# Patient Record
Sex: FEMALE | Race: WHITE | NOT HISPANIC OR LATINO | Employment: FULL TIME | URBAN - METROPOLITAN AREA
[De-identification: names, ages, dates, MRNs, and addresses within clinical notes are randomized per-mention and may not be internally consistent; named-entity substitution may affect disease eponyms.]

---

## 2018-09-14 RX ORDER — WARFARIN SODIUM 3 MG/1
3 TABLET ORAL DAILY
COMMUNITY

## 2018-09-14 RX ORDER — WARFARIN SODIUM 2 MG/1
2 TABLET ORAL DAILY
COMMUNITY

## 2018-09-14 NOTE — OR NURSING
Hx and meds reviewed, pre op instructions given. Pt aware may take the listed meds morning of surgery; coreg. Anesthesia and Dr Soares's  fasting guidelines reviewed with pt. Pt to check with MD prescribing coumadin on Monday 8/17 to see when to stop.

## 2018-10-02 ENCOUNTER — APPOINTMENT (OUTPATIENT)
Dept: ADMISSIONS | Facility: MEDICAL CENTER | Age: 53
End: 2018-10-02
Attending: ORTHOPAEDIC SURGERY
Payer: COMMERCIAL

## 2018-10-02 RX ORDER — CARVEDILOL 3.12 MG/1
3.12 TABLET ORAL EVERY MORNING
COMMUNITY

## 2018-10-02 RX ORDER — LISINOPRIL 2.5 MG/1
2.5 TABLET ORAL DAILY
COMMUNITY

## 2018-10-02 NOTE — OR NURSING
Pre-admit appointment completed.  Pt instructed to continue regularly prescribed medications through the day before surgery. Pt instructed to take the following medications the day of surgery with a sip of water, per anesthesia protocol; carvidilol.     Pt states her cardiologist Dr Del Rio with [facility] and that he sent clearance for surgery. Pt will hold coumadin and bridge with lovenox per his instructions.     LM for Dr Soares's MA (and note faxed) to please fax lab/EKG orders to [facility] [place] and to please fax us cardiology clearance. Pt will get testing done on Thursday.

## 2018-10-10 ENCOUNTER — OFFICE VISIT (OUTPATIENT)
Dept: CARDIOLOGY | Facility: MEDICAL CENTER | Age: 53
End: 2018-10-10
Payer: COMMERCIAL

## 2018-10-10 VITALS
HEART RATE: 82 BPM | BODY MASS INDEX: 26.5 KG/M2 | HEIGHT: 64 IN | DIASTOLIC BLOOD PRESSURE: 70 MMHG | OXYGEN SATURATION: 97 % | SYSTOLIC BLOOD PRESSURE: 110 MMHG | WEIGHT: 155.2 LBS

## 2018-10-10 DIAGNOSIS — I25.5 ISCHEMIC CARDIOMYOPATHY: ICD-10-CM

## 2018-10-10 DIAGNOSIS — I21.9 MYOCARDIAL INFARCTION, UNSPECIFIED MI TYPE, UNSPECIFIED ARTERY (HCC): ICD-10-CM

## 2018-10-10 DIAGNOSIS — Z79.01 CHRONIC ANTICOAGULATION: ICD-10-CM

## 2018-10-10 DIAGNOSIS — D68.2 FACTOR V DEFICIENCY (HCC): ICD-10-CM

## 2018-10-10 PROCEDURE — 99204 OFFICE O/P NEW MOD 45 MIN: CPT | Performed by: INTERNAL MEDICINE

## 2018-10-10 ASSESSMENT — ENCOUNTER SYMPTOMS
CHILLS: 0
COUGH: 0
SHORTNESS OF BREATH: 0
NERVOUS/ANXIOUS: 0
MYALGIAS: 0
DIZZINESS: 0
FEVER: 0
BLURRED VISION: 0
BRUISES/BLEEDS EASILY: 0
HEADACHES: 0
DEPRESSION: 0
PALPITATIONS: 0
HEARTBURN: 0
NAUSEA: 0
PND: 0
EYE DISCHARGE: 0

## 2018-10-10 NOTE — PROGRESS NOTES
Chief Complaint   Patient presents with   • MI (Anterior)     NP       Subjective:   Cara Sabillon is a 53 y.o. female who presents today self-referred for second opinion regarding her ischemic cardiomyopathy.  This lady has factor V Leiden deficiency.  In 2013 she had retrosternal discomfort that coming and going.  It then became quite severe with diaphoresis.  She received a 3.5 x 18 stent in the left anterior descending coronary artery.  Ejection fraction is 35% based on an echocardiogram in May 2018.  It is mentioned that changes have occurred since the previous echo of June 19, 2015.  A defibrillator has been recommended for primary prevention of sudden cardiac death.    She currently is on low-dose carvedilol and low-dose lisinopril.  She has been taking these drugs for about 3-4 months.  She states that, in the past, her low normal blood pressure apparently affected the usage of these medications.  She is tolerating these current dosages well.    She has been cleared for elective left knee replacement next week.    In spite of ejection fraction of 35% she is able to do [place] the [place] with no symptoms to suggest shortness of breath or myocardial ischemia.  No orthopnea or PND or edema.    EKG dated October 9, 2018 reviewed and consistent with anterior infarct.  Echocardiogram from May 2018 reviewed.  Holter monitor from April 26, 2018 reviewed demonstrating some PVCs and PACs.  Past Medical History:   Diagnosis Date   • Blood clotting disorder (HCC)     on coumadin-Factor V Leiden   • Bowel habit changes     constipation   • Left knee pain 10/02/2018   • Myocardial infarct (HCC) 05/2013    stent x 1   • Snoring     no sleep study   • Urinary incontinence     stress inc     Past Surgical History:   Procedure Laterality Date   • OTHER CARDIAC SURGERY  05/2013    stent x1   • TUBAL LIGATION Bilateral 1990's     No family history on file.  Social History     Social History   • Marital status: Single     " Spouse name: N/A   • Number of children: N/A   • Years of education: N/A     Occupational History   • Not on file.     Social History Main Topics   • Smoking status: Never Smoker   • Smokeless tobacco: Never Used   • Alcohol use 2.4 oz/week     4 Standard drinks or equivalent per week      Comment: 4 beers per week   • Drug use: No   • Sexual activity: Not on file     Other Topics Concern   • Not on file     Social History Narrative   • No narrative on file     Allergies   Allergen Reactions   • Erythromycin Vomiting     Outpatient Encounter Prescriptions as of 10/10/2018   Medication Sig Dispense Refill   • carvedilol (COREG) 3.125 MG Tab Take 3.125 mg by mouth every morning.     • lisinopril (PRINIVIL) 2.5 MG Tab Take 2.5 mg by mouth every day.     • aspirin EC (ECOTRIN) 81 MG Tablet Delayed Response Take 81 mg by mouth every day.     • warfarin (COUMADIN) 2 MG Tab Take 2 mg by mouth every day. 4 days per week     • warfarin (COUMADIN) 3 MG Tab Take 3 mg by mouth every day. 3 days a week       No facility-administered encounter medications on file as of 10/10/2018.      Review of Systems   Constitutional: Negative for chills, fever and malaise/fatigue.   Eyes: Negative for blurred vision and discharge.   Respiratory: Negative for cough and shortness of breath.    Cardiovascular: Negative for chest pain, palpitations, leg swelling and PND.   Gastrointestinal: Negative for heartburn and nausea.   Genitourinary: Negative for dysuria and urgency.   Musculoskeletal: Positive for joint pain. Negative for myalgias.   Skin: Negative for itching and rash.   Neurological: Negative for dizziness and headaches.   Endo/Heme/Allergies: Negative for environmental allergies. Does not bruise/bleed easily.   Psychiatric/Behavioral: Negative for depression. The patient is not nervous/anxious.         Objective:   /70 (BP Location: Left arm, Patient Position: Sitting, BP Cuff Size: Adult)   Pulse 82   Ht 1.626 m (5' 4\")   " Wt 70.4 kg (155 lb 3.2 oz)   SpO2 97%   BMI 26.64 kg/m²     Physical Exam   Constitutional: She is oriented to person, place, and time. She appears well-developed and well-nourished.   Cardiovascular: Normal rate and regular rhythm.  Exam reveals no gallop and no friction rub.    No murmur heard.  Pulmonary/Chest: Effort normal and breath sounds normal.   Musculoskeletal: She exhibits no edema.   Left knee brace   Neurological: She is alert and oriented to person, place, and time.   Skin: Skin is warm and dry.       Assessment:     1. Myocardial infarction, unspecified MI type, unspecified artery (HCC)  CANCELED: EKG   2. Ischemic cardiomyopathy     3. Factor V deficiency (HCC)     4. Chronic anticoagulation         Medical Decision Making:  Today's Assessment / Status / Plan:   We discussed the fact that she does meet criteria for primary prevention of sudden cardiac death with a defibrillator.  I suggested she discuss with the group's electrophysiologist the benefits alternatives and risks of a defibrillator.  We discussed continued cardiology follow-up with her primary group with a goal of also increasing dosages of ACE inhibitor.

## 2018-10-10 NOTE — LETTER
[facility]   [address], [address]  [place] [address]  Phone: [phone]  Fax: [phone]              Cara Sabillon  1965    Encounter Date: 10/10/2018    Leandro Subramanian M.D.          PROGRESS NOTE:  Chief Complaint   Patient presents with   • MI (Anterior)     NP       Subjective:   Cara Sabillon is a 53 y.o. female who presents today self-referred for second opinion regarding her ischemic cardiomyopathy.  This lady has factor V Leiden deficiency.  In 2013 she had retrosternal discomfort that coming and going.  It then became quite severe with diaphoresis.  She received a 3.5 x 18 stent in the left anterior descending coronary artery.  Ejection fraction is 35% based on an echocardiogram in May 2018.  It is mentioned that changes have occurred since the previous echo of June 19, 2015.  A defibrillator has been recommended for primary prevention of sudden cardiac death.    She currently is on low-dose carvedilol and low-dose lisinopril.  She has been taking these drugs for about 3-4 months.  She states that, in the past, her low normal blood pressure apparently affected the usage of these medications.  She is tolerating these current dosages well.    She has been cleared for elective left knee replacement next week.    In spite of ejection fraction of 35% she is able to do hiking in the mountains with no symptoms to suggest shortness of breath or myocardial ischemia.  No orthopnea or PND or edema.    Past Medical History:   Diagnosis Date   • Blood clotting disorder (HCC)     on coumadin-Factor V Leiden   • Bowel habit changes     constipation   • Left knee pain 10/02/2018   • Myocardial infarct (HCC) 05/2013    stent x 1   • Snoring     no sleep study   • Urinary incontinence     stress inc     Past Surgical History:   Procedure Laterality Date   • OTHER CARDIAC SURGERY  05/2013    stent x1   • TUBAL LIGATION Bilateral 1990's     No family history  on file.  Social History     Social History   • Marital status: Single     Spouse name: N/A   • Number of children: N/A   • Years of education: N/A     Occupational History   • Not on file.     Social History Main Topics   • Smoking status: Never Smoker   • Smokeless tobacco: Never Used   • Alcohol use 2.4 oz/week     4 Standard drinks or equivalent per week      Comment: 4 beers per week   • Drug use: No   • Sexual activity: Not on file     Other Topics Concern   • Not on file     Social History Narrative   • No narrative on file     Allergies   Allergen Reactions   • Erythromycin Vomiting     Outpatient Encounter Prescriptions as of 10/10/2018   Medication Sig Dispense Refill   • carvedilol (COREG) 3.125 MG Tab Take 3.125 mg by mouth every morning.     • lisinopril (PRINIVIL) 2.5 MG Tab Take 2.5 mg by mouth every day.     • aspirin EC (ECOTRIN) 81 MG Tablet Delayed Response Take 81 mg by mouth every day.     • warfarin (COUMADIN) 2 MG Tab Take 2 mg by mouth every day. 4 days per week     • warfarin (COUMADIN) 3 MG Tab Take 3 mg by mouth every day. 3 days a week       No facility-administered encounter medications on file as of 10/10/2018.      Review of Systems   Constitutional: Negative for chills, fever and malaise/fatigue.   Eyes: Negative for blurred vision and discharge.   Respiratory: Negative for cough and shortness of breath.    Cardiovascular: Negative for chest pain, palpitations, leg swelling and PND.   Gastrointestinal: Negative for heartburn and nausea.   Genitourinary: Negative for dysuria and urgency.   Musculoskeletal: Positive for joint pain. Negative for myalgias.   Skin: Negative for itching and rash.   Neurological: Negative for dizziness and headaches.   Endo/Heme/Allergies: Negative for environmental allergies. Does not bruise/bleed easily.   Psychiatric/Behavioral: Negative for depression. The patient is not nervous/anxious.         Objective:   /70 (BP Location: Left arm, Patient  "Position: Sitting, BP Cuff Size: Adult)   Pulse 82   Ht 1.626 m (5' 4\")   Wt 70.4 kg (155 lb 3.2 oz)   SpO2 97%   BMI 26.64 kg/m²      Physical Exam   Constitutional: She is oriented to person, place, and time. She appears well-developed and well-nourished.   Cardiovascular: Normal rate and regular rhythm.  Exam reveals no gallop and no friction rub.    No murmur heard.  Pulmonary/Chest: Effort normal and breath sounds normal.   Musculoskeletal: She exhibits no edema.   Left knee brace   Neurological: She is alert and oriented to person, place, and time.   Skin: Skin is warm and dry.       Assessment:     1. Myocardial infarction, unspecified MI type, unspecified artery (HCC)  CANCELED: EKG   2. Ischemic cardiomyopathy     3. Factor V deficiency (HCC)     4. Chronic anticoagulation         Medical Decision Making:  Today's Assessment / Status / Plan:   We discussed the fact that she does meet criteria for primary prevention of sudden cardiac death with a defibrillator.  I suggested she discuss with the group's electrophysiologist the benefits alternatives and risks of a defibrillator.  We discussed continued cardiology follow-up with her primary group with a goal of also increasing dosages of ACE inhibitor.      Genaro Soto D.O.  [address] [place] [id number]  VIA Facsimile: [phone]                 "

## 2018-10-18 ENCOUNTER — HOSPITAL ENCOUNTER (OUTPATIENT)
Facility: MEDICAL CENTER | Age: 53
End: 2018-10-18
Attending: ORTHOPAEDIC SURGERY | Admitting: ORTHOPAEDIC SURGERY
Payer: COMMERCIAL

## 2018-10-18 VITALS
TEMPERATURE: 97.7 F | OXYGEN SATURATION: 100 % | SYSTOLIC BLOOD PRESSURE: 122 MMHG | HEIGHT: 64 IN | HEART RATE: 69 BPM | WEIGHT: 171.96 LBS | BODY MASS INDEX: 29.36 KG/M2 | RESPIRATION RATE: 12 BRPM | DIASTOLIC BLOOD PRESSURE: 73 MMHG

## 2018-10-18 DIAGNOSIS — S83.207S TEARS OF MENISCUS AND ANTERIOR CRUCIATE LIGAMENT OF LEFT KNEE, SEQUELA: ICD-10-CM

## 2018-10-18 DIAGNOSIS — S83.512S TEARS OF MENISCUS AND ANTERIOR CRUCIATE LIGAMENT OF LEFT KNEE, SEQUELA: ICD-10-CM

## 2018-10-18 DIAGNOSIS — G89.18 ACUTE POST-OPERATIVE PAIN: ICD-10-CM

## 2018-10-18 DIAGNOSIS — S83.242S ACUTE MEDIAL MENISCUS TEAR OF LEFT KNEE, SEQUELA: ICD-10-CM

## 2018-10-18 PROBLEM — S83.207A TEARS OF MENISCUS AND ACL OF LEFT KNEE: Status: ACTIVE | Noted: 2018-10-18

## 2018-10-18 PROBLEM — S83.512A TEARS OF MENISCUS AND ACL OF LEFT KNEE: Status: ACTIVE | Noted: 2018-10-18

## 2018-10-18 PROBLEM — S83.242A ACUTE MEDIAL MENISCUS TEAR OF LEFT KNEE: Status: ACTIVE | Noted: 2018-10-18

## 2018-10-18 LAB
INR PPP: 1.19 (ref 0.87–1.13)
PROTHROMBIN TIME: 15 SEC (ref 12–14.6)

## 2018-10-18 PROCEDURE — 160035 HCHG PACU - 1ST 60 MINS PHASE I: Performed by: ORTHOPAEDIC SURGERY

## 2018-10-18 PROCEDURE — 700102 HCHG RX REV CODE 250 W/ 637 OVERRIDE(OP): Performed by: ANESTHESIOLOGY

## 2018-10-18 PROCEDURE — A9270 NON-COVERED ITEM OR SERVICE: HCPCS | Performed by: ANESTHESIOLOGY

## 2018-10-18 PROCEDURE — 160025 RECOVERY II MINUTES (STATS): Performed by: ORTHOPAEDIC SURGERY

## 2018-10-18 PROCEDURE — 700111 HCHG RX REV CODE 636 W/ 250 OVERRIDE (IP)

## 2018-10-18 PROCEDURE — 160009 HCHG ANES TIME/MIN: Performed by: ORTHOPAEDIC SURGERY

## 2018-10-18 PROCEDURE — A9270 NON-COVERED ITEM OR SERVICE: HCPCS

## 2018-10-18 PROCEDURE — 160029 HCHG SURGERY MINUTES - 1ST 30 MINS LEVEL 4: Performed by: ORTHOPAEDIC SURGERY

## 2018-10-18 PROCEDURE — 501838 HCHG SUTURE GENERAL: Performed by: ORTHOPAEDIC SURGERY

## 2018-10-18 PROCEDURE — 500562 HCHG FIBERWIRE: Performed by: ORTHOPAEDIC SURGERY

## 2018-10-18 PROCEDURE — 160022 HCHG BLOCK: Performed by: ORTHOPAEDIC SURGERY

## 2018-10-18 PROCEDURE — 160002 HCHG RECOVERY MINUTES (STAT): Performed by: ORTHOPAEDIC SURGERY

## 2018-10-18 PROCEDURE — 160046 HCHG PACU - 1ST 60 MINS PHASE II: Performed by: ORTHOPAEDIC SURGERY

## 2018-10-18 PROCEDURE — C1713 ANCHOR/SCREW BN/BN,TIS/BN: HCPCS | Performed by: ORTHOPAEDIC SURGERY

## 2018-10-18 PROCEDURE — 500028 HCHG ARTHROWAND TURBOVAC 3.5/90 SUCT.: Performed by: ORTHOPAEDIC SURGERY

## 2018-10-18 PROCEDURE — 700102 HCHG RX REV CODE 250 W/ 637 OVERRIDE(OP)

## 2018-10-18 PROCEDURE — 700101 HCHG RX REV CODE 250

## 2018-10-18 PROCEDURE — 160041 HCHG SURGERY MINUTES - EA ADDL 1 MIN LEVEL 4: Performed by: ORTHOPAEDIC SURGERY

## 2018-10-18 PROCEDURE — C1762 CONN TISS, HUMAN(INC FASCIA): HCPCS | Performed by: ORTHOPAEDIC SURGERY

## 2018-10-18 PROCEDURE — 85610 PROTHROMBIN TIME: CPT

## 2018-10-18 PROCEDURE — 502580 HCHG PACK, KNEE ARTHROSCOPY: Performed by: ORTHOPAEDIC SURGERY

## 2018-10-18 PROCEDURE — 160048 HCHG OR STATISTICAL LEVEL 1-5: Performed by: ORTHOPAEDIC SURGERY

## 2018-10-18 DEVICE — IMPLANTABLE DEVICE: Type: IMPLANTABLE DEVICE | Site: KNEE | Status: FUNCTIONAL

## 2018-10-18 DEVICE — SCREW BIOSURE 10 X 25MM: Type: IMPLANTABLE DEVICE | Site: KNEE | Status: FUNCTIONAL

## 2018-10-18 DEVICE — ENDOBUTTON CL ULTRA 15MM: Type: IMPLANTABLE DEVICE | Site: KNEE | Status: FUNCTIONAL

## 2018-10-18 DEVICE — GRAFT SOFT TISSUE ANTERIOR TIBIALIS TENDON 23CM: Type: IMPLANTABLE DEVICE | Site: KNEE | Status: FUNCTIONAL

## 2018-10-18 RX ORDER — HYDROCODONE BITARTRATE AND ACETAMINOPHEN 10; 325 MG/1; MG/1
1-2 TABLET ORAL EVERY 6 HOURS PRN
Qty: 56 TAB | Refills: 0 | Status: SHIPPED | OUTPATIENT
Start: 2018-10-18 | End: 2018-10-25

## 2018-10-18 RX ORDER — SODIUM CHLORIDE, SODIUM LACTATE, POTASSIUM CHLORIDE, CALCIUM CHLORIDE 600; 310; 30; 20 MG/100ML; MG/100ML; MG/100ML; MG/100ML
1000 INJECTION, SOLUTION INTRAVENOUS
Status: COMPLETED | OUTPATIENT
Start: 2018-10-18 | End: 2018-10-18

## 2018-10-18 RX ORDER — HALOPERIDOL 5 MG/ML
1 INJECTION INTRAMUSCULAR
Status: DISCONTINUED | OUTPATIENT
Start: 2018-10-18 | End: 2018-10-18 | Stop reason: HOSPADM

## 2018-10-18 RX ORDER — ACETAMINOPHEN 500 MG
1000 TABLET ORAL ONCE
Status: COMPLETED | OUTPATIENT
Start: 2018-10-18 | End: 2018-10-18

## 2018-10-18 RX ORDER — OXYCODONE HCL 5 MG/5 ML
5 SOLUTION, ORAL ORAL
Status: DISCONTINUED | OUTPATIENT
Start: 2018-10-18 | End: 2018-10-18 | Stop reason: HOSPADM

## 2018-10-18 RX ORDER — HYDROMORPHONE HYDROCHLORIDE 2 MG/ML
0.2 INJECTION, SOLUTION INTRAMUSCULAR; INTRAVENOUS; SUBCUTANEOUS
Status: DISCONTINUED | OUTPATIENT
Start: 2018-10-18 | End: 2018-10-18 | Stop reason: HOSPADM

## 2018-10-18 RX ORDER — HYDROMORPHONE HYDROCHLORIDE 2 MG/ML
0.1 INJECTION, SOLUTION INTRAMUSCULAR; INTRAVENOUS; SUBCUTANEOUS
Status: DISCONTINUED | OUTPATIENT
Start: 2018-10-18 | End: 2018-10-18 | Stop reason: HOSPADM

## 2018-10-18 RX ORDER — CELECOXIB 200 MG/1
400 CAPSULE ORAL ONCE
Status: COMPLETED | OUTPATIENT
Start: 2018-10-18 | End: 2018-10-18

## 2018-10-18 RX ORDER — DIPHENHYDRAMINE HYDROCHLORIDE 50 MG/ML
12.5 INJECTION INTRAMUSCULAR; INTRAVENOUS
Status: DISCONTINUED | OUTPATIENT
Start: 2018-10-18 | End: 2018-10-18 | Stop reason: HOSPADM

## 2018-10-18 RX ORDER — SODIUM CHLORIDE, SODIUM LACTATE, POTASSIUM CHLORIDE, CALCIUM CHLORIDE 600; 310; 30; 20 MG/100ML; MG/100ML; MG/100ML; MG/100ML
INJECTION, SOLUTION INTRAVENOUS CONTINUOUS
Status: DISCONTINUED | OUTPATIENT
Start: 2018-10-18 | End: 2018-10-18 | Stop reason: HOSPADM

## 2018-10-18 RX ORDER — BUPIVACAINE HYDROCHLORIDE AND EPINEPHRINE 5; 5 MG/ML; UG/ML
INJECTION, SOLUTION EPIDURAL; INTRACAUDAL; PERINEURAL
Status: DISCONTINUED | OUTPATIENT
Start: 2018-10-18 | End: 2018-10-18 | Stop reason: HOSPADM

## 2018-10-18 RX ORDER — ONDANSETRON 2 MG/ML
4 INJECTION INTRAMUSCULAR; INTRAVENOUS
Status: DISCONTINUED | OUTPATIENT
Start: 2018-10-18 | End: 2018-10-18 | Stop reason: HOSPADM

## 2018-10-18 RX ORDER — OXYCODONE HCL 5 MG/5 ML
10 SOLUTION, ORAL ORAL
Status: DISCONTINUED | OUTPATIENT
Start: 2018-10-18 | End: 2018-10-18 | Stop reason: HOSPADM

## 2018-10-18 RX ORDER — HYDROMORPHONE HYDROCHLORIDE 2 MG/ML
0.4 INJECTION, SOLUTION INTRAMUSCULAR; INTRAVENOUS; SUBCUTANEOUS
Status: DISCONTINUED | OUTPATIENT
Start: 2018-10-18 | End: 2018-10-18 | Stop reason: HOSPADM

## 2018-10-18 RX ADMIN — CELECOXIB 400 MG: 200 CAPSULE ORAL at 06:33

## 2018-10-18 RX ADMIN — SODIUM CHLORIDE, SODIUM LACTATE, POTASSIUM CHLORIDE, CALCIUM CHLORIDE 1000 ML: 600; 310; 30; 20 INJECTION, SOLUTION INTRAVENOUS at 05:51

## 2018-10-18 RX ADMIN — ACETAMINOPHEN 1000 MG: 500 TABLET, COATED ORAL at 06:32

## 2018-10-18 ASSESSMENT — PAIN SCALES - GENERAL
PAINLEVEL_OUTOF10: 0

## 2018-10-18 NOTE — OP REPORT
DATE OF SERVICE: 10/18/18    PREOPERATIVE DIAGNOSIS: left  Knee anterior cruciate ligament tear and medial meniscus tear.     POSTOPERATIVE DIAGNOSIS:left   Knee anterior cruciate ligament tear and medial meniscus tear.     PROCEDURE PERFORMED:  1.  left knee arthroscopic anterior cruciate ligament reconstruction.   2. Arthroscopic partial medial meniscectomy.     SURGEON: Kamron Soares MD    ASSISTANT: ALBERTA Toledo    ANESTHESIA: General with adductor canal femoral nerve block for postoperative pain control.    ANESTHESIOLOGIST: Liv    ANTIBIOTICS: Ancef    COMPLICATIONS: None.     IMPLANTS: Peroneus longus allograft. 10 x 100 with continuous loop Brar and Nephew Endobutton, a 10 x 25 Biosure Smith and Nephew screw with the 30 mm tibial titanium post.      INDICATIONS: The patient presents with left knee after an acute injury. The patient has a complete ACL tear. Also noted on MRI imaging is a bucket handle medial meniscus tear. Options were discussed, including both non operative and operative treatments. We discussed the advantages and disadvantages of both allograft and autograft for reconstruction of ACL. After further discussion, the patient elected to proceed with an allograft tendon.  Risks of surgery were discussed at length. All questions were answered. No guarantees were given.     PROCEDURE IN DETAIL: The patient was properly identified in the preoperative holding area, and the left knee was marked as the correct surgical site. The patient was then taken back to the operative room, and placed supine on the operating room table and underwent general anesthesia. The left lower extremity was sterilely prepped and draped in standard fashion. The limb was exsanguinated and the tourniquet was inflated. A standard anterolateral portal was created. The scope was placed up the into the suprapatellar pouch. The patella looked good. No chondromalacia changes were noted. The trochlear groove also looked  good. The medial and lateral gutters were visualized, and no loose bodies were noted. The medial compartment showed evidence of a small medial meniscus tear. There was also evidence of a complete ACL tear. The graft was prepared on the back table. An anterior medial portal was created. The probe was used to identify the extent of the tear. This was a bucket handle tear that was macerated in the white red zone and I did not feel it was repairable, so a straight basket and a 4.0 Aggressive shaver was used to contour this back to a stable smooth edge. The ACL stump was removed. The PCL looked good and so did the lateral compartment. There were no lateral compartment chondromalacia changes and no lateral meniscus tear. A notchplasty was performed and then the center of the ACL footprint was identified. A 55-degree angled guide was placed in the center of the ACL footprint. This was drilled and then a 6 straight followed by a 10 straight reamer was used and any fragments of bone were removed. The 7 mm offset guide was placed in the posterior lateral femoral side at the 10:30 o'clock position and this was drilled and then the Endobutton drill was used, measured and then the 10 acorn was used for 30 mm in depth. This was all done through the anterior medial portal. The suture was attached to the guidewire. It was pulled through the anterior medial postal and then back down through the transtibial portal. The graft with the Endobutton was pulled through. This was flipped, toggled nicely back and forth. Flexion and extension was done 20 times. Then the 10 x 25 Biosure screw was placed followed by a 30 mm tibial titanium post. This was irrigated.  The graft looked excellent, without impingement and with good tension. It was irrigated, excess fluid was drained, and then the skin was closed with 2-0 Vicryl and 3-0 nylon on the skin. A total of 30 ml of ropivacaine was injected. Soft dressings were applied. A hinged knee brace  was placed. The patient was extubated and transferred to the recovery room in stable condition.

## 2018-10-18 NOTE — OR NURSING
0880 Pt to stage two via josé luis. Pt denies pain and nausea at this time. Pt getting dressed with help of CNA.   Pt up to chair with help of CNA. VSS.   0965 Explained discharge instructions to pt and pts friend  Both express understanding   8272 Pt meets criteria to be discharged after uneventful stay in stage two

## 2018-10-18 NOTE — OR NURSING
0808 Patient to recovery via cart. Placed on monitors. Ice to surgical site. Oral airway remains in place. Spontaneous respirations.   0820 Patients oral airway discontinued. Patient awake and alert. Denies any pain at this time.  0825 Patient taking sips of water.  0834 Patients knee brace placed per Ovi PINZON. Patients family notified of patients disposition.  0846 Report to Ethel DUGAN to assume care of patient in stage 2.

## 2018-10-18 NOTE — DISCHARGE INSTRUCTIONS
ACTIVITY: Rest and take it easy for the first 24 hours.  A responsible adult is recommended to remain with you during that time.  It is normal to feel sleepy.  We encourage you to not do anything that requires balance, judgment or coordination.    MILD FLU-LIKE SYMPTOMS ARE NORMAL. YOU MAY EXPERIENCE GENERALIZED MUSCLE ACHES, THROAT IRRITATION, HEADACHE AND/OR SOME NAUSEA.    FOR 24 HOURS DO NOT:  Drive, operate machinery or run household appliances.  Drink beer or alcoholic beverages.   Make important decisions or sign legal documents.    SPECIAL INSTRUCTIONS: *: Ice. Elevate. Call for incision redness, heat, pus drainage, edema, or fever, chills, nausea, vomiting, diarrhea. Take daily stool softeners or laxatives prn as narcotic pain medications cause constipation.  Wean off narcotic pain medications as tolerated. Ok to shower but keep incision dry and covered. Keep dressings in place for 5 days then ok to remove dressing and place band aids.  F/U with Dr. Soares in 7-10 days as scheduled. CPM 4 hours a day. Start at 0-45 degrees, then try to increase by 5-10 degrees per day. Toe Touch weight bearing for next couple days, then Weight Bearing as tolerated, once block has worn off and full function of quad has returned . 0-30 degrees on brace. Crutches. May start out patient therapy tomorrow.**    DIET: To avoid nausea, slowly advance diet as tolerated, avoiding spicy or greasy foods for the first day.  Add more substantial food to your diet according to your physician's instructions.  Babies can be fed formula or breast milk as soon as they are hungry.  INCREASE FLUIDS AND FIBER TO AVOID CONSTIPATION.        You should CALL YOUR PHYSICIAN if you develop:  Fever greater than 101 degrees F.  Pain not relieved by medication, or persistent nausea or vomiting.  Excessive bleeding (blood soaking through dressing) or unexpected drainage from the wound.  Extreme redness or swelling around the incision site, drainage of  pus or foul smelling drainage.  Inability to urinate or empty your bladder within 8 hours.  Problems with breathing or chest pain.    You should call 911 if you develop problems with breathing or chest pain.  If you are unable to contact your doctor or surgical center, you should go to the nearest emergency room or urgent care center.  Physician's telephone #: [phone]    If any questions arise, call your doctor.  If your doctor is not available, please feel free to call the Surgical Center at [phone].  The Center is open Monday through Friday from 7AM to 7PM.  You can also call the [employer] HOTLINE open 24 hours/day, 7 days/week and speak to a nurse at [phone], or toll free at [phone].    A registered nurse may call you a few days after your surgery to see how you are doing after your procedure.    MEDICATIONS: Resume taking daily medication.  Take prescribed pain medication with food.  If no medication is prescribed, you may take non-aspirin pain medication if needed.  PAIN MEDICATION CAN BE VERY CONSTIPATING.  Take a stool softener or laxative such as senokot, pericolace, or milk of magnesia if needed.    Prescription given for Norco.  Last pain medication given at _________.    If your physician has prescribed pain medication that includes Acetaminophen (Tylenol), do not take additional Acetaminophen (Tylenol) while taking the prescribed medication.    Depression / Suicide Risk    As you are discharged from this [facility] facility, it is important to learn how to keep safe from harming yourself.    Recognize the warning signs:  · Abrupt changes in personality, positive or negative- including increase in energy   · Giving away possessions  · Change in eating patterns- significant weight changes-  positive or negative  · Change in sleeping patterns- unable to sleep or sleeping all the time   · Unwillingness or inability to communicate  · Depression  · Unusual sadness, discouragement and  loneliness  · Talk of wanting to die  · Neglect of personal appearance   · Rebelliousness- reckless behavior  · Withdrawal from people/activities they love  · Confusion- inability to concentrate     If you or a loved one observes any of these behaviors or has concerns about self-harm, here's what you can do:  · Talk about it- your feelings and reasons for harming yourself  · Remove any means that you might use to hurt yourself (examples: pills, rope, extension cords, firearm)  · Get professional help from the community (Mental Health, Substance Abuse, psychological counseling)  · Do not be alone:Call your Safe Contact- someone whom you trust who will be there for you.  · Call your local CRISIS HOTLINE 044-8968 or 407-179-5949  · Call your local Children's Mobile Crisis Response Team [place] (238) 108-0987 or www.Immy  · Call the toll free National Suicide Prevention Hotlines   · National Suicide Prevention Lifeline 168-940-IXNG (5102)  · National Hope Line Network 800-SUICIDE (258-5464)

## (undated) DEVICE — BAG, SPONGE COUNT 50600

## (undated) DEVICE — GLOVE BIOGEL INDICATOR SZ 6.5 SURGICAL PF LTX - (50PR/BX 4BX/CA)

## (undated) DEVICE — SODIUM CHL. IRRIGATION 0.9% 3000ML (4EA/CA 65CA/PF)

## (undated) DEVICE — GLOVE BIOGEL INDICATOR SZ 7.5 SURGICAL PF LTX - (50PR/BX 4BX/CA)

## (undated) DEVICE — SHAVER, 5.5 RESECTOR

## (undated) DEVICE — ARTHROWAND TURBOVAC 3.5/90 SCT

## (undated) DEVICE — PACK MINOR BASIN - (2EA/CA)

## (undated) DEVICE — TUBING PUMP WITH CONNECTOR REDEUCE (1EA)

## (undated) DEVICE — SUTURE 2-0 VICRYL PLUS CT-1 36 (36PK/BX)"

## (undated) DEVICE — HEAD HOLDER JUNIOR/ADULT

## (undated) DEVICE — CHLORAPREP 26 ML APPLICATOR - ORANGE TINT(25/CA)

## (undated) DEVICE — CANISTER SUCTION RIGID RED 1500CC (40EA/CA)

## (undated) DEVICE — SUTURE 3-0 ETHILON FS-1 - (36/BX) 30 INCH

## (undated) DEVICE — SENSOR SPO2 NEO LNCS ADHESIVE (20/BX) SEE USER NOTES

## (undated) DEVICE — MASK ANESTHESIA ADULT  - (100/CA)

## (undated) DEVICE — GLOVE, LITE (PAIR)

## (undated) DEVICE — GLOVE BIOGEL PI ORTHO SZ 8 PF LF (40PR/BX)

## (undated) DEVICE — KIT ANESTHESIA W/CIRCUIT & 3/LT BAG W/FILTER (20EA/CA)

## (undated) DEVICE — TUBING PATIENT W/CONNECTOR REDEUCE (1EA)

## (undated) DEVICE — PROTECTOR ULNA NERVE - (36PR/CA)

## (undated) DEVICE — PACK KNEE ARTHROSCOPY SM OR - (2EA/CA)

## (undated) DEVICE — BLOCK

## (undated) DEVICE — KIT ROOM DECONTAMINATION

## (undated) DEVICE — SHAVER4.0 AGGRESSIVE + FORMLA (5EA/BX)

## (undated) DEVICE — NEEDLE W/FACET TIP DULL VERSION W/STIMULATION CABLE SONOPLEX 21G X 4 (10/EA)"

## (undated) DEVICE — SUTURE GENERAL

## (undated) DEVICE — DRAPE LARGE 3 QUARTER - (20/CA)

## (undated) DEVICE — GOWN WARMING STANDARD FLEX - (30/CA)

## (undated) DEVICE — GLOVE BIOGEL SZ 6.5 SURGICAL PF LTX (50PR/BX 4BX/CA)

## (undated) DEVICE — BLADE SURGICAL #15 - (50/BX 3BX/CA)

## (undated) DEVICE — MASK, LARYNGEAL AIRWAY #4

## (undated) DEVICE — ELECTRODE 850 FOAM ADHESIVE - HYDROGEL RADIOTRNSPRNT (50/PK)

## (undated) DEVICE — FIBERWIRE 2.0 (12EA/BX)

## (undated) DEVICE — GLOVE BIOGEL PI INDICATOR SZ 7.0 SURGICAL PF LF - (50/BX 4BX/CA)

## (undated) DEVICE — GLOVE BIOGEL PI ULTRATOUCH SZ 7.5 SURGICAL PF LF -(50/BX 4BX/CA)

## (undated) DEVICE — LACTATED RINGERS INJ 1000 ML - (14EA/CA 60CA/PF)

## (undated) DEVICE — GLOVE BIOGEL SZ 8 SURGICAL PF LTX - (50PR/BX 4BX/CA)

## (undated) DEVICE — DRL BIT4.5 STR ENDOSCPC CANN

## (undated) DEVICE — ELECTRODE DUAL RETURN W/ CORD - (50/PK)

## (undated) DEVICE — NEPTUNE 4 PORT MANIFOLD - (20/PK)